# Patient Record
Sex: FEMALE | Race: WHITE | NOT HISPANIC OR LATINO | Employment: STUDENT | URBAN - METROPOLITAN AREA
[De-identification: names, ages, dates, MRNs, and addresses within clinical notes are randomized per-mention and may not be internally consistent; named-entity substitution may affect disease eponyms.]

---
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
## 2022-08-15 ENCOUNTER — LAB REQUISITION (OUTPATIENT)
Dept: LAB | Facility: CLINIC | Age: 22
End: 2022-08-15
Payer: COMMERCIAL

## 2022-08-15 DIAGNOSIS — R35.0 FREQUENCY OF MICTURITION: ICD-10-CM

## 2022-08-15 PROCEDURE — 87086 URINE CULTURE/COLONY COUNT: CPT | Mod: ORL | Performed by: OBSTETRICS & GYNECOLOGY

## 2022-08-17 LAB — BACTERIA UR CULT: NORMAL

## 2022-08-23 ENCOUNTER — LAB REQUISITION (OUTPATIENT)
Dept: LAB | Facility: CLINIC | Age: 22
End: 2022-08-23
Payer: COMMERCIAL

## 2022-08-23 DIAGNOSIS — R30.9 PAINFUL MICTURITION, UNSPECIFIED: ICD-10-CM

## 2022-08-23 PROCEDURE — 87086 URINE CULTURE/COLONY COUNT: CPT | Mod: ORL | Performed by: OBSTETRICS & GYNECOLOGY

## 2022-08-25 LAB — BACTERIA UR CULT: NO GROWTH

## 2024-03-11 ENCOUNTER — LAB REQUISITION (OUTPATIENT)
Dept: LAB | Facility: CLINIC | Age: 24
End: 2024-03-11

## 2024-03-11 DIAGNOSIS — R53.83 OTHER FATIGUE: ICD-10-CM

## 2024-03-11 LAB
ERYTHROCYTE [DISTWIDTH] IN BLOOD BY AUTOMATED COUNT: 11.7 % (ref 10–15)
HCT VFR BLD AUTO: 39.7 % (ref 35–47)
HGB BLD-MCNC: 13.5 G/DL (ref 11.7–15.7)
MCH RBC QN AUTO: 30.6 PG (ref 26.5–33)
MCHC RBC AUTO-ENTMCNC: 34 G/DL (ref 31.5–36.5)
MCV RBC AUTO: 90 FL (ref 78–100)
PLATELET # BLD AUTO: 261 10E3/UL (ref 150–450)
RBC # BLD AUTO: 4.41 10E6/UL (ref 3.8–5.2)
WBC # BLD AUTO: 6.4 10E3/UL (ref 4–11)

## 2024-03-11 PROCEDURE — 85027 COMPLETE CBC AUTOMATED: CPT | Performed by: OBSTETRICS & GYNECOLOGY

## 2024-03-11 PROCEDURE — 80053 COMPREHEN METABOLIC PANEL: CPT | Performed by: OBSTETRICS & GYNECOLOGY

## 2024-03-11 PROCEDURE — 82306 VITAMIN D 25 HYDROXY: CPT | Performed by: OBSTETRICS & GYNECOLOGY

## 2024-03-11 PROCEDURE — 82728 ASSAY OF FERRITIN: CPT | Performed by: OBSTETRICS & GYNECOLOGY

## 2024-03-11 PROCEDURE — 84443 ASSAY THYROID STIM HORMONE: CPT | Performed by: OBSTETRICS & GYNECOLOGY

## 2024-03-12 LAB
ALBUMIN SERPL BCG-MCNC: 4.6 G/DL (ref 3.5–5.2)
ALP SERPL-CCNC: 60 U/L (ref 40–150)
ALT SERPL W P-5'-P-CCNC: 9 U/L (ref 0–50)
ANION GAP SERPL CALCULATED.3IONS-SCNC: 8 MMOL/L (ref 7–15)
AST SERPL W P-5'-P-CCNC: 18 U/L (ref 0–45)
BILIRUB SERPL-MCNC: 0.7 MG/DL
BUN SERPL-MCNC: 12 MG/DL (ref 6–20)
CALCIUM SERPL-MCNC: 9.3 MG/DL (ref 8.6–10)
CHLORIDE SERPL-SCNC: 100 MMOL/L (ref 98–107)
CREAT SERPL-MCNC: 0.62 MG/DL (ref 0.51–0.95)
DEPRECATED HCO3 PLAS-SCNC: 29 MMOL/L (ref 22–29)
EGFRCR SERPLBLD CKD-EPI 2021: >90 ML/MIN/1.73M2
FERRITIN SERPL-MCNC: 93 NG/ML (ref 6–175)
GLUCOSE SERPL-MCNC: 100 MG/DL (ref 70–99)
POTASSIUM SERPL-SCNC: 4.3 MMOL/L (ref 3.4–5.3)
PROT SERPL-MCNC: 7.2 G/DL (ref 6.4–8.3)
SODIUM SERPL-SCNC: 137 MMOL/L (ref 135–145)
TSH SERPL DL<=0.005 MIU/L-ACNC: 0.94 UIU/ML (ref 0.3–4.2)
VIT D+METAB SERPL-MCNC: 17 NG/ML (ref 20–50)

## 2024-10-03 ENCOUNTER — HOSPITAL ENCOUNTER (EMERGENCY)
Facility: CLINIC | Age: 24
Discharge: HOME | End: 2024-10-03
Attending: EMERGENCY MEDICINE | Admitting: EMERGENCY MEDICINE
Payer: COMMERCIAL

## 2024-10-03 ENCOUNTER — HOSPITAL ENCOUNTER (INPATIENT)
Age: 24
End: 2024-10-03
Attending: PSYCHIATRY & NEUROLOGY
Payer: COMMERCIAL

## 2024-10-03 ENCOUNTER — TELEPHONE (OUTPATIENT)
Dept: BEHAVIORAL HEALTH | Facility: CLINIC | Age: 24
End: 2024-10-03

## 2024-10-03 VITALS
OXYGEN SATURATION: 98 % | RESPIRATION RATE: 16 BRPM | TEMPERATURE: 97.5 F | HEART RATE: 75 BPM | DIASTOLIC BLOOD PRESSURE: 70 MMHG | SYSTOLIC BLOOD PRESSURE: 125 MMHG

## 2024-10-03 DIAGNOSIS — R45.851 SUICIDAL IDEATION: ICD-10-CM

## 2024-10-03 DIAGNOSIS — Z72.89 INAPPROPRIATE SEXUAL BEHAVIOR: ICD-10-CM

## 2024-10-03 DIAGNOSIS — F10.920 ALCOHOLIC INTOXICATION WITHOUT COMPLICATION (H): ICD-10-CM

## 2024-10-03 PROBLEM — F32.A DEPRESSION, UNSPECIFIED: Status: ACTIVE | Noted: 2024-10-03

## 2024-10-03 PROBLEM — F10.929 ALCOHOL INTOXICATION (H): Status: ACTIVE | Noted: 2024-10-03

## 2024-10-03 LAB
ALBUMIN SERPL BCG-MCNC: 4.6 G/DL (ref 3.5–5.2)
ALCOHOL BREATH TEST: 0.31 (ref 0–0.01)
ALP SERPL-CCNC: 87 U/L (ref 40–150)
ALT SERPL W P-5'-P-CCNC: 48 U/L (ref 0–50)
AMPHETAMINES UR QL SCN: ABNORMAL
ANION GAP SERPL CALCULATED.3IONS-SCNC: 16 MMOL/L (ref 7–15)
AST SERPL W P-5'-P-CCNC: 51 U/L (ref 0–45)
BARBITURATES UR QL SCN: ABNORMAL
BENZODIAZ UR QL SCN: ABNORMAL
BILIRUB SERPL-MCNC: 0.3 MG/DL
BUN SERPL-MCNC: 12.9 MG/DL (ref 6–20)
BZE UR QL SCN: ABNORMAL
CALCIUM SERPL-MCNC: 9 MG/DL (ref 8.8–10.4)
CANNABINOIDS UR QL SCN: ABNORMAL
CHLORIDE SERPL-SCNC: 101 MMOL/L (ref 98–107)
CREAT SERPL-MCNC: 0.59 MG/DL (ref 0.51–0.95)
EGFRCR SERPLBLD CKD-EPI 2021: >90 ML/MIN/1.73M2
ERYTHROCYTE [DISTWIDTH] IN BLOOD BY AUTOMATED COUNT: 11.9 % (ref 10–15)
FENTANYL UR QL: ABNORMAL
GLUCOSE SERPL-MCNC: 84 MG/DL (ref 70–99)
HCG UR QL: NEGATIVE
HCO3 SERPL-SCNC: 23 MMOL/L (ref 22–29)
HCT VFR BLD AUTO: 42.7 % (ref 35–47)
HGB BLD-MCNC: 14.2 G/DL (ref 11.7–15.7)
MAGNESIUM SERPL-MCNC: 2.1 MG/DL (ref 1.7–2.3)
MCH RBC QN AUTO: 30.9 PG (ref 26.5–33)
MCHC RBC AUTO-ENTMCNC: 33.3 G/DL (ref 31.5–36.5)
MCV RBC AUTO: 93 FL (ref 78–100)
OPIATES UR QL SCN: ABNORMAL
PCP QUAL URINE (ROCHE): ABNORMAL
PHOSPHATE SERPL-MCNC: 3.6 MG/DL (ref 2.5–4.5)
PLATELET # BLD AUTO: 289 10E3/UL (ref 150–450)
POTASSIUM SERPL-SCNC: 4 MMOL/L (ref 3.4–5.3)
PROT SERPL-MCNC: 7.7 G/DL (ref 6.4–8.3)
RBC # BLD AUTO: 4.6 10E6/UL (ref 3.8–5.2)
SODIUM SERPL-SCNC: 140 MMOL/L (ref 135–145)
WBC # BLD AUTO: 6.7 10E3/UL (ref 4–11)

## 2024-10-03 PROCEDURE — 85027 COMPLETE CBC AUTOMATED: CPT | Performed by: EMERGENCY MEDICINE

## 2024-10-03 PROCEDURE — 36415 COLL VENOUS BLD VENIPUNCTURE: CPT | Performed by: EMERGENCY MEDICINE

## 2024-10-03 PROCEDURE — 99207 PR NO CHARGE LOS: CPT | Performed by: NURSE PRACTITIONER

## 2024-10-03 PROCEDURE — 80053 COMPREHEN METABOLIC PANEL: CPT | Performed by: EMERGENCY MEDICINE

## 2024-10-03 PROCEDURE — 81025 URINE PREGNANCY TEST: CPT | Performed by: EMERGENCY MEDICINE

## 2024-10-03 PROCEDURE — 83735 ASSAY OF MAGNESIUM: CPT | Performed by: EMERGENCY MEDICINE

## 2024-10-03 PROCEDURE — 84100 ASSAY OF PHOSPHORUS: CPT | Performed by: EMERGENCY MEDICINE

## 2024-10-03 PROCEDURE — 250N000013 HC RX MED GY IP 250 OP 250 PS 637: Performed by: EMERGENCY MEDICINE

## 2024-10-03 PROCEDURE — 80307 DRUG TEST PRSMV CHEM ANLYZR: CPT | Performed by: EMERGENCY MEDICINE

## 2024-10-03 PROCEDURE — 99285 EMERGENCY DEPT VISIT HI MDM: CPT

## 2024-10-03 RX ORDER — FOLIC ACID 1 MG/1
1 TABLET ORAL DAILY
OUTPATIENT
Start: 2024-10-04

## 2024-10-03 RX ORDER — MAGNESIUM HYDROXIDE/ALUMINUM HYDROXICE/SIMETHICONE 120; 1200; 1200 MG/30ML; MG/30ML; MG/30ML
30 SUSPENSION ORAL EVERY 4 HOURS PRN
OUTPATIENT
Start: 2024-10-03

## 2024-10-03 RX ORDER — MULTIPLE VITAMINS W/ MINERALS TAB 9MG-400MCG
1 TAB ORAL DAILY
OUTPATIENT
Start: 2024-10-04

## 2024-10-03 RX ORDER — DIAZEPAM 5 MG/1
5-20 TABLET ORAL EVERY 30 MIN PRN
OUTPATIENT
Start: 2024-10-03

## 2024-10-03 RX ORDER — CLONIDINE HYDROCHLORIDE 0.1 MG/1
0.1 TABLET ORAL EVERY 8 HOURS
Status: DISCONTINUED | OUTPATIENT
Start: 2024-10-03 | End: 2024-10-03 | Stop reason: HOSPADM

## 2024-10-03 RX ORDER — MULTIPLE VITAMINS W/ MINERALS TAB 9MG-400MCG
1 TAB ORAL DAILY
Status: DISCONTINUED | OUTPATIENT
Start: 2024-10-03 | End: 2024-10-03 | Stop reason: HOSPADM

## 2024-10-03 RX ORDER — FLUMAZENIL 0.1 MG/ML
0.2 INJECTION, SOLUTION INTRAVENOUS
Status: DISCONTINUED | OUTPATIENT
Start: 2024-10-03 | End: 2024-10-03 | Stop reason: HOSPADM

## 2024-10-03 RX ORDER — DIAZEPAM 10 MG/2ML
5-10 INJECTION, SOLUTION INTRAMUSCULAR; INTRAVENOUS EVERY 30 MIN PRN
Status: DISCONTINUED | OUTPATIENT
Start: 2024-10-03 | End: 2024-10-03 | Stop reason: HOSPADM

## 2024-10-03 RX ORDER — ONDANSETRON 4 MG/1
4 TABLET, ORALLY DISINTEGRATING ORAL EVERY 6 HOURS PRN
OUTPATIENT
Start: 2024-10-03

## 2024-10-03 RX ORDER — HYDROXYZINE HYDROCHLORIDE 25 MG/1
25 TABLET, FILM COATED ORAL EVERY 4 HOURS PRN
OUTPATIENT
Start: 2024-10-03

## 2024-10-03 RX ORDER — LOPERAMIDE HYDROCHLORIDE 2 MG/1
2 CAPSULE ORAL 4 TIMES DAILY PRN
OUTPATIENT
Start: 2024-10-03

## 2024-10-03 RX ORDER — AMOXICILLIN 250 MG
1 CAPSULE ORAL 2 TIMES DAILY PRN
OUTPATIENT
Start: 2024-10-03

## 2024-10-03 RX ORDER — FOLIC ACID 1 MG/1
1 TABLET ORAL DAILY
Status: DISCONTINUED | OUTPATIENT
Start: 2024-10-03 | End: 2024-10-03 | Stop reason: HOSPADM

## 2024-10-03 RX ORDER — DIAZEPAM 5 MG/1
10 TABLET ORAL EVERY 30 MIN PRN
Status: DISCONTINUED | OUTPATIENT
Start: 2024-10-03 | End: 2024-10-03 | Stop reason: HOSPADM

## 2024-10-03 RX ORDER — TRAZODONE HYDROCHLORIDE 50 MG/1
50 TABLET, FILM COATED ORAL
OUTPATIENT
Start: 2024-10-03

## 2024-10-03 RX ADMIN — THIAMINE HCL TAB 100 MG 100 MG: 100 TAB at 16:44

## 2024-10-03 RX ADMIN — CLONIDINE HYDROCHLORIDE 0.1 MG: 0.1 TABLET ORAL at 16:44

## 2024-10-03 RX ADMIN — FOLIC ACID 1 MG: 1 TABLET ORAL at 16:44

## 2024-10-03 RX ADMIN — Medication 1 TABLET: at 16:44

## 2024-10-03 ASSESSMENT — ACTIVITIES OF DAILY LIVING (ADL)
ADLS_ACUITY_SCORE: 35

## 2024-10-03 ASSESSMENT — COLUMBIA-SUICIDE SEVERITY RATING SCALE - C-SSRS
3. HAVE YOU BEEN THINKING ABOUT HOW YOU MIGHT KILL YOURSELF?: NO
2. HAVE YOU ACTUALLY HAD ANY THOUGHTS OF KILLING YOURSELF IN THE PAST MONTH?: YES
6. HAVE YOU EVER DONE ANYTHING, STARTED TO DO ANYTHING, OR PREPARED TO DO ANYTHING TO END YOUR LIFE?: YES
4. HAVE YOU HAD THESE THOUGHTS AND HAD SOME INTENTION OF ACTING ON THEM?: YES
1. IN THE PAST MONTH, HAVE YOU WISHED YOU WERE DEAD OR WISHED YOU COULD GO TO SLEEP AND NOT WAKE UP?: YES
5. HAVE YOU STARTED TO WORK OUT OR WORKED OUT THE DETAILS OF HOW TO KILL YOURSELF? DO YOU INTEND TO CARRY OUT THIS PLAN?: NO

## 2024-10-03 NOTE — ED TRIAGE NOTES
Pt BIBA PD. 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 Called 911 for help with detox. Prior to this episode pt was sober for 9 months. Reports SI without a plan.      Triage Assessment (Adult)       Row Name 10/03/24 1154          Triage Assessment    Airway WDL WDL        Respiratory WDL    Respiratory WDL WDL        Skin Circulation/Temperature WDL    Skin Circulation/Temperature WDL WDL        Cardiac WDL    Cardiac WDL WDL        Peripheral/Neurovascular WDL    Peripheral Neurovascular WDL WDL        Cognitive/Neuro/Behavioral WDL    Cognitive/Neuro/Behavioral WDL WDL

## 2024-10-03 NOTE — ED PROVIDER NOTES
Emergency Department Note      History of Present Illness     Chief Complaint  Alcohol Intoxication and Suicidal    HPI  Estelita Suarez is a 24 year old female who presents the emergency room with alcohol intoxication asking for help with detox.  She also states that if she did not come here today she thinks that she would have actually killed her self at home, but states she does not have a plan.  She states that she has been drinking at least a liter of vodka every single day for the last 30 days or more.  Denies any trauma, last drink was today.      Independent Historian  no    Review of External Notes  na      Past Medical History   Medical History and Problem List  No past medical history on file.    Medications  acetaminophen (TYLENOL) 160 MG/5ML elixir  norgestimate-ethinyl estradiol (ORTHO-CYCLEN, SPRINTEC) 0.25-35 MG-MCG per tablet  oxyCODONE (ROXICODONE) 5 MG/5ML solution        Surgical History   Past Surgical History:   Procedure Laterality Date    ENT SURGERY      bilateral BM-T    TONSILLECTOMY, ADENOIDECTOMY, COMBINED Bilateral 8/14/2017    Procedure: COMBINED TONSILLECTOMY, ADENOIDECTOMY;  TONSILLECTOMY;  Surgeon: John Cano MD;  Location:  OR         Physical Exam   Patient Vitals for the past 24 hrs:   BP Temp Temp src Pulse Resp SpO2   10/03/24 1154 136/89 97.4  F (36.3  C) Temporal 92 18 98 %       Physical Exam  Vitals: reviewed by me  General: Pt seen on [facility], pleasant, cooperative, and alert to conversation, very flirtatious, intoxicated appearing.  Eyes: Tracking well, clear conjunctiva BL  ENT: MMM, midline trachea.   Lungs: No tachypnea, no accessory muscle use. No respiratory distress.   CV: Rate as above  MSK: no joint effusion.  No evidence of trauma  Skin: No rash  Neuro: Somewhat slurred speech and no facial droop.  Psych: Not RIS, no e/o AH/VH.  Does state that she is suicidal    Diagnostics   Lab Results   Labs Ordered and Resulted from Time of ED Arrival to  Time of ED Departure   ALCOHOL BREATH TEST POCT - Abnormal       Result Value    Alcohol Breath Test 0.31 (*)          ED Course      Medications Administered   Medications - No data to display       Procedures      Discussion of Management   Yes I have requested a mental health assessment with our DEC .        Optional/Additional Documentation  Stress/Adjustment Disorders       Medical Decision Making / Diagnosis         MDM  This is a pleasant 24-year-old female who presents the emergency room with what seems to be significant alcohol intoxication, desire for detox, but also suicidality.  She will need to stay here until she was evaluable by DEC so that they can get a good assessment on whether or not she is still suicidal while sober.  If she goes in alcohol withdrawal I think that this would need to be treated as she may have been drinking heavily for over a month and her alcohol level here is actually quite elevated.  She is quite flirtatious and initially seem to be trying to take off her shirt when I entered the room at which point I turned around and we had a female chaperone come into the room for the remainder of the conversation.  I asked the patient previously if she wanted a chaperone and she said no.  Other than this she has been quite pleasant and nonaggressive and will stay here until she is metabolized her alcohol.  Because of this I placed her on ANITA and intend disposition based on her DEC assessment and also what her alcohol withdrawal syndrome appears to be, that is if it is treatable with oral benzos or IV.    ICD-10 Codes:    ICD-10-CM    1. Alcoholic intoxication without complication (H)  F10.920       2. Inappropriate sexual behavior  Z72.89       3. Suicidal ideation  R45.851                             Ari Robles MD  10/03/24 2956
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
## 2024-10-03 NOTE — ED NOTES
Detox bed held for patient, patient had all required labs done and resulted. Medically cleared per Dr Mireles. [facility] detox called and updated on patient's results. [facility] will evaluate and call back if patient is accepted.

## 2024-10-03 NOTE — TELEPHONE ENCOUNTER
S: [facility] ED , Provider Katie calling at 6:21 Pm with clinical on a 24 year old/Female presenting for alcohol detox.     B: Pt presents for ETOH detox.   Currently reports drinking 1 P of vodka, daily for a month.    Patient reports last use was prior to admission.  Pt KAYE: 31  Pt  denies hx of DT  Pt  denies hx of seizures. Last seizure: N/A  Pt endorsing the following symptoms of withdrawal:  None  MSSA Score: None    Pt denies acute mental health or medical concerns.   Pt endorses other drug use: (!) CANNABIS PRODUCTS Amount/frequency:  Cannabis    Does Pt have a detox care plan in [facility]? No  Does pt present with specific needs, assistive devices, or exclusionary criteria? None  Is the patient ambulating, eating and drinking in the ED? Yes    A: Pt meets criteria to be presented for IP detox admission. Patient is voluntary    COVID Symptoms: No  If yes, COVID test required   Utox: Positive for Cannabis  Magnesium: WNL  CMP: Abnormalities: AST: 51  CBC: WNL  HCG: Negative     R: Patient cleared and ready for behavioral bed placement: Yes    Pt is meeting criteria for presentation to [facility]    Does Patient need a Transfer Center request created? Yes, writer completed Transfer Center request at: 6:30 PM    6:28 PM Intake paged provider Chris.             7:05 PM Intake received a call from Katie CASTRO cancelling this pts referral.     7:14 PM Intake received a call from Katie CASTRO reporting that the provider requested that the pt admit to [facility] for detox. RN would like to move forward with admission.     7:26 PM Intake called [facility] and provided disposition to CRN, [facility]. Nurse report: Charge will call ED.     7:29 PM Intake called [facility] ED and provided placement information to King CASTRO.     7:33 PM Indicia complete.

## 2024-10-03 NOTE — ED PROVIDER NOTES
Patient signed out to myself awaiting DEC assessment.    Patient notes ongoing suicidal thoughts as well as ongoing alcohol issues.  She desires detox as well as treatment for her mental health.  Patient does not have a specific plan for suicide other than to drink herself to death.  She has not had a seizure in the past.  Patient looks medically clear for transfer for detox and mental health help.     Aditya Mireles MD  10/03/24 8813    As we were waiting for detox she alerted us that she didn't want detox and would like to go home with her mother and her pets.  She is not suicidal.  She notes she can stop drinking on her own.  DEC was contacted again and is giving resources.  Patient's discharged home.     Aditya Mireles MD  10/03/24 9190

## 2024-10-04 ENCOUNTER — TELEPHONE (OUTPATIENT)
Dept: BEHAVIORAL HEALTH | Facility: CLINIC | Age: 24
End: 2024-10-04
Payer: COMMERCIAL

## 2024-10-04 NOTE — DISCHARGE INSTRUCTIONS
Mental Health Follow up care:     Today you were seen by a licensed mental health professional through Triage and Transition services, Behavioral Healthcare Providers ([facility])  for a crisis assessment in the Emergency Department at [facility].  It is recommended that you follow up with your established providers (psychiatrist, mental health therapist, and/or primary care doctor - as relevant) as soon as possible. Coordinators from [facility] will be calling you in the next 24-48 hours to ensure that you have the resources you need.  You can also contact [facility] coordinators directly at [phone]. You may have been scheduled for or offered an appointment with a mental health provider. [facility] maintains an extensive network of licensed behavioral health providers to connect patients with the services they need.  We do not charge providers a fee to participate in our referral network.  We match patients with providers based on a patient's specific needs, insurance coverage, and location.  Our first effort will be to refer you to a provider within your care system, and will utilize providers outside your care system as needed.            Ways to help cope with sobriety:     Take prescribed medicines as scheduled   Keep follow-up appointments   Talk to others about your concerns   Get regular exercise   Practice deep breathing skills   Eat a healthy diet   Use community resources, including hotline numbers, [facility] crisis and support meetings   Stay sober and avoid places/people/things associated with substance use   Maintain a daily schedule/routine   Get at least 7-8 hours of sleep per night   Create a list of 10-20 healthy activities that you can do that are enjoyable and do not involve substance use   Create daily goals (approx. 1-4 goals) per day and work to achieve them throughout the day       It is recommended that you abstain from all mood-altering chemicals. Please contact the sober support hotline ([phone]) as  needed; phones are answered 24 hours a day, 7 days a week.      To access substance use treatment you must have a comprehensive assessment completed to begin any treatment program.    If uninsured, please contact your county of residence for an eligibility screening for substance use disorder evaluation and treatment.   [place] - [phone]   [place] - [phone]   [facility] [phone]   [place]  [phone]   [place]  [phone]   [place] - [phone]   [facility] [phone]   [place] - [phone]     If you have private insurance, call the customer service number on the back of your insurance card to find an in-network substance use disorder assessment provider. The ideal provider will be a treatment facility, licensed in the [facility].     Community AMAJRIT Evaluations    Clients can call their county for a full list of providers. Availability and services are subject to change, please call to confirm. Most locations offer virtual and in person options.      Fast Tracker: Fast Tracker (Cloud Sherpas.org): MatrixVisionTracker links people to Mental Health & Substance Use Disorder Resources by searching for services with real time availability.   [facility]  Mental Health and Addiction Services Line: [phone]  [address] [place], [place], [address]  Virtual and In Person options available by appointments  [facility]  [phone]  Mon-Friday: [address] [facility], [address]  Saturday: [address], [place], [address]  M-F assessments (7a-1:45p); Saturday walk-in assessments (7:45-10:45a) www.Centerstone [employer]   [place]  [phone] *must have Private Insurance Plan, no State Insurance plans (phone consultation available 24/7)  In-person Assessments: [address], [address], [place], [place] [address] [address] [place] [address]  [address] [place], [place] [address] [place] [address] /  www.[facility].org   [facility]  [phone]  [address] [facility] [address], [place], [place], [address]  *Virtual options, appointments only / www.DoodleSaint Joseph.[facility] Adult Mental Health  [phone]  [address] [place], [place], [address]  *walk ins available M-F   [facility]  [phone]  [address] [place], [place], [address]  *available by appointments only / www.[facility].org   [facility]  [phone]  [address] [place], [place], [address]  *available by appointment only / www.Accera   Avivo  [phone] or [phone] [address] [facility], [place], [place], [address]  *walk in assessments available M-F starting at 7 am.   clickworker [employer] / Phone: [phone] / Locations: [facility], [facility] / www.Enrich Social Productions.Rightware Oy   [facility] Mental Health and Addiction Connection Line  [phone]  [facility]  [address], [place], [place], [address]  *Walk in assessments available M-F starting at 8 am OR [phone]   [place] [facility] / [phone] / Locations: [facility], [place] and [place] / www.[facility].org   [facility] / Phone: [phone] / [address] [facility], [facility] [address] / *appointments only / www.Tethys BioSciencerecDurect Corp..[employer]ogeco [employer] / Phone: [phone]/ Locations: [place], [place] and [place] / *appointments only / www.Curious Sensemn.org    [employer] / [phone] or [phone] / Virtual + Locations: [place], [place], [place], [place], [place], [place], [place], [place], [place], [place], [place], [place], [place], [place], [place], [place], [place], [place], [place], [place], [place], [place], [place], [place], [place], [place], [place], [place], [place], [place] / www.[facility] Behavioral Health/New Beginnings /  [phone] or [phone] Virtual + Locations: [place], [place], [place], [place], [facility]/St [place], [place], [place], [place] www.VYRE Limitedprograms.ScoreFeeder   José [facility] [employer] [facility]  [phone] or [phone]  [address] [place] [address] / www.Muufri.ScoreFeeder   [facility]  [phone]  [address] [place] [place], [address]   [facility] / [phone] / [address] [facility], [facility] [address] / *appointments only   [facility] / [phone] / Locations: [place] / www.[email or web address].org   Clues ([facility])  [phone]  [address] [facility], [place], [place], [address]  *available by appointment [email or web address]   [facility]  [phone]  [address] [place], [place], [address] / www.Metafor Software.ScoreFeeder   [place]  [phone]  [address] [facility], [facility] [address] [id number] [phone]  [address] [facility] [address] / www.ReadyCart.ScoreFeeder   [facility] / [phone] / Locations: [place] / *appointments only / www.ClydeSenior Wellness Solutions.[facility]   3 R s Pending sale to [facility] / [phone] / [address] [facility] [address] / www.[email or web address]   [address] [facility] / [phone] / [address] [facility] [address] / www.[email or web address]   [facility] / [phone] / [address] [facility] [address] / www.nuway.org   [facility] Hospit[facility] Leaders / [phone] / Virtual + Locations: [place], [place]aryl Coe and Victor Manuel / www.Embanetnchemicalhealth.com   [facility] / [phone] / [address] [facility] [address] / *appointments only / www.[facility].org       If you are intoxicated, you may be required to detox at a detox facility before starting treatment. The following are detox facilities where you can seek services.      [facility]: [address], [place], [place],  [address]  [phone]    [facility]: [address] [place], [place], [address]  [phone]   [facility]: [address] [place], [place], [address]  [phone]    [facility] ([facility]): [address] [place], [place], [address]  [phone]   [facility]: [address] [place], [place], [address] [phone])         [facility]: [facility], [address] [place] [address], First Floor, [address], [place] [address] (next to the outpatient lab)    Phone: [phone]   Provides bridging services to people with Opiate Use Disorders (OUD) seeking care. This is a front door to Medication Assisted Treatments (MAT), Peer Recovery and Nursing services and referrals to Substance Use Disorder (AMARJIT) Assessments.   People ages 16 years and older are served, Open 5 days weekly   Walk In hours: Monday-Friday 9:00am-3:00pm       [facility] Connection ([facility]): [facility] connects people seeking recovery to resources that help foster and sustain long-term recovery. Whether you are seeking resources for treatment, transportation, housing, job training, education, health care or other pathways to recovery, [facility] is a great place to start. Phone: [phone]. www.minnesotaJamgle.Guangdong Baolihua New Energy Stock (Great listing of all types of recovery and non-recovery related resources)       Konnect Solutions: https://www.be2.org/ (Online meetings, support groups, resources)       Alcoholics Anonymous: 1-800-ALCOHOL  HTTP://WWW.AA.ORG/  AA [place] (323-471-9169 or http://aamin[place].org)  AA [place] (330-649-1954 or www.aastpaul.org)       Narcotics Anonymous: 981.243.4011  www.naminn[place].us.       [employer] [facility]: [address] [place] [address], [address], [place], [place]  [phone]  Drop-in Hours: Monday-Friday 9-11:30 am. By appointment at other times.  [facility] is a drop-in center on the east side [facility] that provides a safe space  for individuals who are homeless and have a history of chemical use. Sobriety is not a requirement but drugs and alcohol are not allowed on the property.  Non-clients can access drop-in services such as Recovery and Harm Reduction Groups, referrals to case management, community activities, shower facilities, and a pool table. Individuals who are homeless and have chemical health needs may be eligible for enrollment into [facility]'s case management program. Clients and  work together to access benefits, treatment, health care, shelter, and external housing resources.       [facility] Chemical Assessment & Referral Unit: [address] [place] [address]  [phone]  Monday-Friday 8 am-5 pm  Assessment and referral for individuals seeking treatment or counseling for chemical dependency. Must be a resident of [facility]. There is no fee for assessment. There is some funding available for treatment programs.

## 2024-10-04 NOTE — PLAN OF CARE
"Estelita Jerri Erick \"Schulz\"  October 3, 2024  Plan of Care Hand-off Note     Patient Care Path:  (Detox)    Plan for Care:   Pt is a 24 year old trangender woman with a history of alcohol use disorder and depression. After consultation with the pt's attending provider and nursing staff, information gathered from pt and her collateral contact; she appears to be appropriate for detox and is interested in transitioning to an inpatient dual diagnosis program.    Identified Goals and Safety Issues: Detox and IP Dual Diagnosis Programming    Overview:  Carey Suarez  [phone], Pt signed RELL for her mom.            Legal Status: Legal Status at Admission: Voluntary/Patient has signed consent for treatment    Psychiatry Consult: Pending       Updated MD and RN regarding plan of care.           OLGA Horn        "

## 2024-10-04 NOTE — CONSULTS
Chart reviewed for admission criteria for detox and admission orders entered as signed and held.

## 2024-10-04 NOTE — PROGRESS NOTES
Writer received call from ED RN, King, stating patient now would like to discharge. Initial DEC assessment was completed by Krzysztof Griffin LGSW on 10/3/2024 at 4:40 PM, see this consult note for more detail.   Per initial assessment, patient was voluntary and not assessed to be holdable. The recommendation was made for patient to discharge with Substance Abuse Disorder Treatment, Individual Therapy, Medication Management services. Patient requested to go to detox voluntarily. Patient does not need to go to detox and may discharge, per initial  recommendation.  Patient will receive follow up call by a [place] mental health coordinator ([phone]) within 24-48 hours from discharge and can help in scheduling any appointments or referrals needed at that time. Additional information/resources was added to patient's AVS.     Ney Haskins on 10/3/2024 at 9:34 PM

## (undated) DEVICE — SOL WATER IRRIG 1000ML BOTTLE 2F7114

## (undated) DEVICE — SPONGE TONSIL W/STRING XLG LATEX FREE

## (undated) DEVICE — PACK T&A RIDGES

## (undated) DEVICE — GLOVE PROTEXIS BLUE W/NEU-THERA 7.0  2D73EB70

## (undated) DEVICE — ESU GROUND PAD ADULT W/CORD E7507

## (undated) DEVICE — GLOVE PROTEXIS W/NEU-THERA 7.5  2D73TE75

## (undated) DEVICE — ESU SUCTION CAUTERY 10FR FOOT CONTROL E2505-10FR

## (undated) DEVICE — BAG CLEAR TRASH 1.3M 39X33" P4040C

## (undated) DEVICE — LINEN HALF SHEET 5512

## (undated) DEVICE — LINEN FULL SHEET 5511

## (undated) DEVICE — SUCTION CANISTER MEDIVAC LINER 3000ML W/LID 65651-530

## (undated) RX ORDER — BUPIVACAINE HYDROCHLORIDE AND EPINEPHRINE 2.5; 5 MG/ML; UG/ML
INJECTION, SOLUTION EPIDURAL; INFILTRATION; INTRACAUDAL; PERINEURAL
Status: DISPENSED
Start: 2017-08-14

## (undated) RX ORDER — CEFAZOLIN SODIUM 2 G/100ML
INJECTION, SOLUTION INTRAVENOUS
Status: DISPENSED
Start: 2017-08-14

## (undated) RX ORDER — DEXAMETHASONE SODIUM PHOSPHATE 10 MG/ML
INJECTION, SOLUTION INTRAMUSCULAR; INTRAVENOUS
Status: DISPENSED
Start: 2017-08-14

## (undated) RX ORDER — FENTANYL CITRATE 50 UG/ML
INJECTION, SOLUTION INTRAMUSCULAR; INTRAVENOUS
Status: DISPENSED
Start: 2017-08-14